# Patient Record
Sex: FEMALE | Race: WHITE | NOT HISPANIC OR LATINO | Employment: UNEMPLOYED | URBAN - METROPOLITAN AREA
[De-identification: names, ages, dates, MRNs, and addresses within clinical notes are randomized per-mention and may not be internally consistent; named-entity substitution may affect disease eponyms.]

---

## 2017-03-08 ENCOUNTER — OFFICE VISIT (OUTPATIENT)
Dept: NEUROLOGY | Facility: MEDICAL CENTER | Age: 62
End: 2017-03-08
Payer: MEDICARE

## 2017-03-08 VITALS
BODY MASS INDEX: 16.38 KG/M2 | HEART RATE: 92 BPM | DIASTOLIC BLOOD PRESSURE: 64 MMHG | SYSTOLIC BLOOD PRESSURE: 114 MMHG | HEIGHT: 62 IN | TEMPERATURE: 100.6 F | WEIGHT: 89 LBS | OXYGEN SATURATION: 94 %

## 2017-03-08 DIAGNOSIS — Z87.898 HISTORY OF MEMORY LOSS: ICD-10-CM

## 2017-03-08 PROCEDURE — G8419 CALC BMI OUT NRM PARAM NOF/U: HCPCS

## 2017-03-08 PROCEDURE — 3017F COLORECTAL CA SCREEN DOC REV: CPT | Mod: 8P

## 2017-03-08 PROCEDURE — 3014F SCREEN MAMMO DOC REV: CPT | Mod: 8P

## 2017-03-08 PROCEDURE — 4004F PT TOBACCO SCREEN RCVD TLK: CPT | Mod: 8P

## 2017-03-08 PROCEDURE — 99203 OFFICE O/P NEW LOW 30 MIN: CPT

## 2017-03-08 PROCEDURE — G8432 DEP SCR NOT DOC, RNG: HCPCS

## 2017-03-08 PROCEDURE — G8484 FLU IMMUNIZE NO ADMIN: HCPCS

## 2017-03-08 RX ORDER — OXYBUTYNIN CHLORIDE 5 MG/1
5 TABLET ORAL DAILY
COMMUNITY
Start: 2016-12-31

## 2017-03-08 RX ORDER — METHIMAZOLE 5 MG/1
5 TABLET ORAL DAILY
COMMUNITY
Start: 2016-12-31

## 2017-03-08 RX ORDER — BUSPIRONE HYDROCHLORIDE 10 MG/1
10 TABLET ORAL 2 TIMES DAILY
COMMUNITY
Start: 2017-03-01

## 2017-03-08 RX ORDER — LIDOCAINE 50 MG/G
5 OINTMENT TOPICAL 3 TIMES DAILY
COMMUNITY
Start: 2017-03-01

## 2017-03-08 RX ORDER — PROMETHAZINE HYDROCHLORIDE 12.5 MG/1
12.5 TABLET ORAL PRN
COMMUNITY
Start: 2017-03-01

## 2017-03-08 RX ORDER — TRAZODONE HYDROCHLORIDE 50 MG/1
50 TABLET ORAL DAILY
COMMUNITY
Start: 2016-12-31

## 2017-03-08 RX ORDER — DULOXETIN HYDROCHLORIDE 20 MG/1
20 CAPSULE, DELAYED RELEASE ORAL DAILY
COMMUNITY
Start: 2017-03-06

## 2017-03-08 RX ORDER — LANOLIN ALCOHOL/MO/W.PET/CERES
1000 CREAM (GRAM) TOPICAL DAILY
COMMUNITY

## 2017-03-08 RX ORDER — OMEPRAZOLE 20 MG/1
20 CAPSULE, DELAYED RELEASE ORAL DAILY
COMMUNITY
Start: 2016-12-31

## 2017-03-08 NOTE — PROGRESS NOTES
Neurology Consult Note  3/8/2017      Referring MD:  Dr. Olson  Patient ID:  Name:             Gisell Pleitez     YOB: 1955  Age:                 62 y.o.  female   MRN:               [id number]                                              Reason for Consult:      Evaluation of possible memory problems    .History of Present Illness:   This 62-year-old lady who has an associate's degree in nursing was having significant memory problems when she was on substantial amounts of narcotics for chronic back pain. She has since been taken off the narcotics and her memory function subjectively improved. She uses lidocaine ointment for pain control which appears to help and uses a walker to take weight off her back. Most of her pain is in the left leg. She is on trazodone and Cymbalta for pain management and takes vitamin B12 and buspirone for anxiety. She has chronic obstructive pulmonary disease . He is also on methimazole or thyroid issues.          Review of Systems: Relates mainly to her back pain  Constitutional: Denies fevers, Denies weight changes  Eyes: Denies changes in vision, no eye pain  Ears/Nose/Throat/Mouth: Denies nasal congestion or sore throat   Cardiovascular: Denies chest pain, Denies palpitations   Respiratory: Denies shortness of breath , Denies cough  Gastrointestinal/Hepatic: Denies abdominal pain, nausea, vomiting, diarrhea, constipation or GI bleeding   Genitourinary: Denies dysuria or frequency  Musculoskeletal/Rheum: Denies  joint pain and swelling, No edema  Skin: Denies rash  Neurological: Denies headache, confusion, memory loss or focal weakness/parasthesias  Psychiatric: denies mood disorder   Endocrine: Bhakti thyroid problems  Heme/Oncology/Lymph Nodes: Denies enlarged lymph nodes, denies brusing or known bleeding disorder  All other systems were reviewed and are negative (AMA/CMS criteria)                Past Medical History:     No past medical history on file.    Problems  addressed this visit:    Problem List Items Addressed This Visit     None      Visit Diagnoses     History of memory loss               Past Surgical History:   No past surgical history on file.      Current Outpatient Medications:  Current Outpatient Prescriptions   Medication   • Cyanocobalamin (VITAMIN B-12) 1000 MCG Tab   • busPIRone (BUSPAR) 10 MG Tab   • duloxetine (CYMBALTA) 20 MG Cap DR Particles   • promethazine (PHENERGAN) 12.5 MG tablet   • oxybutynin (DITROPAN) 5 MG Tab   • omeprazole (PRILOSEC) 20 MG delayed-release capsule   • trazodone (DESYREL) 50 MG Tab   • lidocaine (XYLOCAINE) 5 % Ointment   • methimazole (TAPAZOLE) 5 MG Tab     No current facility-administered medications for this visit.       Medication Allergy:  No Known Allergies    Family History:  No family history on file.    Social History:  Social History     Social History   • Marital Status:      Spouse Name: N/A   • Number of Children: N/A   • Years of Education: N/A     Occupational History   • Not on file.     Social History Main Topics   • Smoking status: Not on file   • Smokeless tobacco: Not on file   • Alcohol Use: Not on file   • Drug Use: Not on file   • Sexual Activity: Not on file     Other Topics Concern   • Not on file     Social History Narrative   • No narrative on file       Physical Exam: She is a very thin woman who appears somewhat older than her stated age. On Mini-Mental status testing she scores 30 out of 30 with normal verbal fluency and is able to copy the figure both directly and for memory . Draw a clock test was normal. Her gait is used with a wheelchair and she uses that to get up out of a chair. She otherwise appears to be stable. She has an absent ankle jerk on the left and decreased toe flexion on the left side. Upper extremity strength and reflexes appear to be intact. Visual fields extraocular movements and pupillary responses are all normal. There is no facial weakness or facial sensory deficit.  "Finger-nose and heel-to-shin testing is done well.  Vitals:   Filed Vitals:    03/08/17 0845   BP: 114/64   Pulse: 92   Temp: 38.1 °C (100.6 °F)   Height: 1.575 m (5' 2\")   Weight: 40.37 kg (89 lb)   SpO2: 94%     General Appearance:   Weight/BMI: Body mass index is 16.27 kg/(m^2).    Neurologic Exam:   AOx3: Normal  Recent & remote memory intact: Yes  Attentive with normal coordination: Yes  Normal spontaneous speech pattern: Yes  Age appropriate fund of knowledge: Yes  Cranial nerve II intact: Normal  Cranial nerve III, IV & VI intact: Normal  Cranial nerve V intact: Normal  Cranial nerve VIII intact: Normal  Cranial nerve IX intact: Normal  Cranial nerve XI intact: Normal  Cranial nerve XII intact: Normal  No sensory deficits: Normal  DTRs intact & symmetrical: Normal   No dysdiadochokinesia or dysmetria: Normal    Eyes:  Normal optic discs: Yes  Visual field: Normal  Pupillary responses: Normal  Extraocular movement: Normal    Cardiovascular:  Normal carotid pulses bilaterally: Yes  RRR with no MRGs: Yes  No peripheral edema, pulses intact: Yes    Musculoskeletal:  Normal gait and station: No uses a walker because of back pain.  Normal muscle strength: No decreased toe flexion left side.  Normal muscle tone, no atrophy: Yes  No abnormal movements: Yes    Plan:   Her Mini-Mental Status score is entirely normal and the supportive tests including her daily function and visual memory appear to be intact. The did review her MRI which does show some degree of atrophy possibly out of proportion to age but otherwise unremarkable. I think the memory problems were related to medications and appeared to have substantially resolved now that she is off medications that are both sedative and could impact her cognition. I will see her again as needed. Discussed this with she and her .    Total length of time of this visit was 40inutes of which greater than 50% was spent counseling the patient/family and coordinating " care.    Thank you for allowing me to participate in his care.    Sincerely yours,        Michoacano Klein MD, PhD

## 2017-03-08 NOTE — MR AVS SNAPSHOT
"        Gisell Pleitez   3/8/2017 9:00 AM   Office Visit   MRN: [id number]    Department:  [facility]   Dept Phone:  [phone]    Description:  Female : 1955   Provider:  Michoacano Klein M.D.           Reason for Visit     New Patient memory deficit      Allergies as of 3/8/2017     No Known Allergies      You were diagnosed with     History of memory loss   [[id number]]         Vital Signs     Blood Pressure Pulse Temperature Height Weight Body Mass Index    114/64 mmHg 92 38.1 °C (100.6 °F) 1.575 m (5' 2\") 40.37 kg (89 lb) 16.27 kg/m2    Oxygen Saturation                   94%           Basic Information     Date Of Birth Sex Race Ethnicity Preferred Language    1955 Female White Non- English      Health Maintenance     Patient has no pending health maintenance at this time      Current Immunizations     No immunizations on file.      Below and/or attached are the medications your provider expects you to take. Review all of your home medications and newly ordered medications with your provider and/or pharmacist. Follow medication instructions as directed by your provider and/or pharmacist. Please keep your medication list with you and share with your provider. Update the information when medications are discontinued, doses are changed, or new medications (including over-the-counter products) are added; and carry medication information at all times in the event of emergency situations     Allergies:  No Known Allergies          Medications  Valid as of: 2017 -  9:27 AM    Generic Name Brand Name Tablet Size Instructions for use    BusPIRone HCl (Tab) BUSPAR 10 MG Take 10 mg by mouth 2 times a day.        Cyanocobalamin (Tab) vitamin B-12 1000 MCG Take 1,000 mcg by mouth every day.        DULoxetine HCl (Cap DR Particles) CYMBALTA 20 MG Take 20 mg by mouth every day.        Lidocaine (Ointment) XYLOCAINE 5 % Apply 5 Applications to affected area(s) 3 times a day.       " MethIMAzole (Tab) TAPAZOLE 5 MG Take 5 mg by mouth every day.        Omeprazole (CAPSULE DELAYED RELEASE) PRILOSEC 20 MG Take 20 mg by mouth every day.        Oxybutynin Chloride (Tab) DITROPAN 5 MG Take 5 mg by mouth every day. 1 tab in AM, 2 tabs in PM        Promethazine HCl (Tab) PHENERGAN 12.5 MG Take 12.5 mg by mouth as needed.        TraZODone HCl (Tab) DESYREL 50 MG Take 50 mg by mouth every day. At bedtime        .                 Medicines prescribed today were sent to:     [facility] #[id number] - [place], [place] - [address]    [address] [place] [address]    Phone: [phone] Fax: [phone]    Open 24 Hours?: No      Medication refill instructions:       If your prescription bottle indicates you have medication refills left, it is not necessary to call your provider’s office. Please contact your pharmacy and they will refill your medication.    If your prescription bottle indicates you do not have any refills left, you may request refills at any time through one of the following ways: The online [employer] One [employer] system (except Urgent Care), by calling your provider’s office, or by asking your pharmacy to contact your provider’s office with a refill request. Medication refills are processed only during regular business hours and may not be available until the next business day. Your provider may request additional information or to have a follow-up visit with you prior to refilling your medication.   *Please Note: Medication refills are assigned a new Rx number when refilled electronically. Your pharmacy may indicate that no refills were authorized even though a new prescription for the same medication is available at the pharmacy. Please request the medicine by name with the pharmacy before contacting your provider for a refill.           [employer] Access Code: [email or web address]  Expires: 4/7/2017  8:27 AM    [employer]  A secure, online tool to manage your health information     [employer]® is a  secure, online tool that connects you to your personalized health information from the privacy of your home -- day or night - making it very easy for you to manage your healthcare. Once the activation process is completed, you can even access your medical information using the 4 the stars baldemar, which is available for free in the Apple Bald[employer] store or Google Play store.     4 the stars provides the following levels of access (as shown below):   My Chart Features   [facility] Doctor [facility]  Specialists [facility]  Urgent  Care Non-[facility]  Primary Care  Doctor   Email your healthcare team securely and privately 24/7 X X X    Manage appointments: schedule your next appointment; view details of past/upcoming appointments X      Request prescription refills. X      View recent personal medical records, including lab and immunizations X X X X   View health record, including health history, allergies, medications X X X X   Read reports about your outpatient visits, procedures, consult and ER notes X X X X   See your discharge summary, which is a recap of your hospital and/or ER visit that includes your diagnosis, lab results, and care plan. X X       How to register for 4 the stars:  1. Go to  [email or web address].  2. Click on the Sign Up Now box, which takes you to the New Member Sign Up page. You will need to provide the following information:  a. Enter your 4 the stars Access Code exactly as it appears at the top of this page. (You will not need to use this code after you’ve completed the sign-up process. If you do not sign up before the expiration date, you must request a new code.)   b. Enter your date of birth.   c. Enter your home email address.   d. Click Submit, and follow the next screen’s instructions.  3. Create a [employer] ID. This will be your 4 the stars login ID and cannot be changed, so think of one that is secure and easy to remember.  4. Create a 4 the stars password. You can change your password at any time.  5. Enter  your Password Reset Question and Answer. This can be used at a later time if you forget your password.   6. Enter your e-mail address. This allows you to receive e-mail notifications when new information is available in [employer].  7. Click Sign Up. You can now view your health information.    For assistance activating your [employer] account, call [phone]